# Patient Record
Sex: MALE | Race: WHITE
[De-identification: names, ages, dates, MRNs, and addresses within clinical notes are randomized per-mention and may not be internally consistent; named-entity substitution may affect disease eponyms.]

---

## 2019-05-30 ENCOUNTER — HOSPITAL ENCOUNTER (INPATIENT)
Dept: HOSPITAL 80 - FED | Age: 39
LOS: 3 days | Discharge: HOME | DRG: 331 | End: 2019-06-02
Attending: SURGERY | Admitting: SURGERY
Payer: COMMERCIAL

## 2019-05-30 DIAGNOSIS — K35.32: Primary | ICD-10-CM

## 2019-05-30 DIAGNOSIS — Z53.31: ICD-10-CM

## 2019-05-30 DIAGNOSIS — E86.9: ICD-10-CM

## 2019-05-30 LAB — PLATELET # BLD: 198 10^3/UL (ref 150–400)

## 2019-05-30 PROCEDURE — 0DTJ0ZZ RESECTION OF APPENDIX, OPEN APPROACH: ICD-10-PCS | Performed by: SURGERY

## 2019-05-30 PROCEDURE — 0DBH0ZZ EXCISION OF CECUM, OPEN APPROACH: ICD-10-PCS | Performed by: SURGERY

## 2019-05-30 PROCEDURE — 0DJW4ZZ INSPECTION OF PERITONEUM, PERCUTANEOUS ENDOSCOPIC APPROACH: ICD-10-PCS | Performed by: SURGERY

## 2019-05-30 RX ADMIN — HYDROCODONE BITARTRATE AND ACETAMINOPHEN PRN TAB: 5; 325 TABLET ORAL at 23:31

## 2019-05-30 RX ADMIN — HYDROCODONE BITARTRATE AND ACETAMINOPHEN PRN TAB: 5; 325 TABLET ORAL at 23:01

## 2019-05-30 NOTE — EDPHY
H & P


Stated Complaint: RLQ abd pain


Time Seen by Provider: 05/30/19 17:31


HPI/ROS: 


HPI:  This is a 38-year-old male who presents with





Chief Complaint:  Appendicitis from Radiology





Location:  Right lower quadrant abdominal 


Quality:  Intermittent, aching Pain


Duration:  3 days


Signs and Symptoms:  no fever, no nausea, no vomiting, no hematemesis, no blood 

in stool, no abdominal bloating, no diarrhea, no back pain, no urinary symptoms

, no testicular/groin pain, no indigestion, no chest pain, no shortness of 

breath


Timing:


Severity:


Context:  Patient presents from Radiology for CT abdomen and pelvis exam that 

was performed approximately 1 and 0.5 hr prior to arrival to the emergency room 

that shows acute appendicitis with an appendicolith.  CT shows an inflamed 

thickened appendix containing a proximal appendicolith measuring 5 x 6 mm.  The 

appendix measures up to 12-14 mm in thickness.  Patient was seen by his primary 

care provider at [facility] earlier this morning and sent this 

afternoon for CT abdomen and pelvis scan to evaluate for right lower quadrant 

abdominal pain.  He reports that he has had intermittent, aching, nonradiating 

periumbilical pain for the last 3 days.  It was not until his primary care 

provider palpated on his right lower quadrant that he noticed pain in that 

area.  He denies any fever, nausea, vomiting, urinary symptoms, back pain, 

diarrhea.  He ran the [employer] on Monday without any difficulty.  As he 

did not have any secondary symptoms of nausea, vomiting, fever, he believes it 

was either gas pain or musculoskeletal in nature.  When it did not stop over 

the last 3 days, he went to his primary care provider today to further 

evaluate.  Last meal was at 12 noon.


Modifying Factors:  None





Comment: 








ROS: A comprehensive 10 system review of systems is otherwise negative aside 

from elements mentioned in the history of present illness. 





MEDICAL/SURGICAL/SOCIAL HISTORY: 


Medical history:  Generally healthy.  Does not take any regular medications.


Surgical history:  Denies


Social history:  Employed,  with 16-month-old son.  Nonsmoker.  Family 

history noncontributory.











CONSTITUTIONAL:  Physically fit,  awake and alert, no obvious distress


HEENT: Atraumatic and normocephalic, PERRL, EOMI.  Nares patent; no rhinorrhea;

  no nasal mucosal edema. Tympanic membranes clear. Oropharynx clear, no 

exudate and moist pink mucosa.  Airway patent.  No lymphadenopathy.  No 

meningismus.


Cardiovascular: Normal S1/S2, regular rate, regular rhythm, without murmur rub 

or gallop.


PULMONARY/CHEST:  Symmetrical and nontender. Clear to auscultation bilaterally. 

Good air movement. No accessory muscle usage.


ABDOMEN:  Soft, nondistended, mild periumbilical tenderness and moderate right 

lower quadrant tenderness, no rebound, no guarding, no peritoneal signs, no 

masses or organomegaly. No CVAT.


EXTREMITIES:  2/2 pulses, strength 5/5, no deformities, no clubbing, no 

cyanosis or edema.


NEUROLOGICAL: no focal neuro deficits.  GCS 15.


SKIN: Warm and dry, no erythema. no rash.  Good capillary refill. 











Source: Patient


Exam Limitations: No limitations





- Personal History


Current Tetanus/Diphtheria Vaccine: Yes


Current Tetanus Diphtheria and Acellular Pertussis (TDAP): Yes





- Medical/Surgical History


Hx Asthma: No


Hx Chronic Respiratory Disease: No


Hx Diabetes: No


Hx Cardiac Disease: No


Hx Renal Disease: No


Hx Cirrhosis: No


Hx Alcoholism: No


Hx HIV/AIDS: No


Hx Splenectomy or Spleen Trauma: No


Other PMH: denies





- Social History


Smoking Status: Never smoked


Constitutional: 


 Initial Vital Signs











Temperature (C)  36.7 C   05/30/19 17:24


 


Heart Rate  59 L  05/30/19 17:24


 


Respiratory Rate  16   05/30/19 17:24


 


Blood Pressure  141/77 H  05/30/19 17:24


 


O2 Sat (%)  98   05/30/19 17:24








 











O2 Delivery Mode               Room Air














Allergies/Adverse Reactions: 


 





No Known Allergies Allergy (Unverified 05/30/19 17:24)


 








Home Medications: 














 Medication  Instructions  Recorded


 


NK [No Known Home Meds]  05/30/19














Medical Decision Making





- Diagnostics


Imaging Results: 


 Imaging Impressions





Abdomen CT  05/30/19 18:00


Impression: 


 


1. Acute appendicitis with an appendicolith. There is also some associated 

inflammation along the caudal margin of the cecum, and there are several 

prominent right lower quadrant lymph nodes. Surgical consultation and 

correlation with histopathology is recommended to assure that there is no 

underlying neoplastic infiltration.


2. There is a small rounded osteolucent lesion in the right L2 centrum, which 

may represent a small hemangioma, however is difficult to further confidently 

characterize. If there is further clinical concern, MR imaging or a bone scan 

could be considered.


3. Mild hepatomegaly.


 


I attempted to convey this information to Kalyani Mayer MD at 16:42, on 5/30 /2019. I spoke with Sally, the  at [phone], and indicated that 

we will escort the patient to the ED at [facility]. I have also spoken with Jamie Rivas PA-C in the ED regarding the results of the patient's CT scan.


 


 











ED Course/Re-evaluation: 


Vital signs reviewed and stable upon arrival.  No systemic signs.


IV access, laboratory studies ordered


Made NPO status, 1 L normal saline, IV Ceftriaxone and IV Flagyl given 


ED decision to consult General surgery.  This was made prior to results of 

laboratory studies.  Spoke with Dr. Brewer who kindly agrees to take 

patient to the OR and provide further care.


1800:  WBC 11 K. No signs of anemia/platelet dysfunction/KIRK/elevated LFTs/

electrolyte imbalance/pancreatitis. 


1810:  Notified by surgery that patient will go to the operating room for a 

laparoscopic appendectomy in approximately 1 hour. 





This patient was seen under the supervision of my secondary supervising 

physician.  I evaluated and cared for this patient with attending.  Discussed 

this patient with Dr. Duran who did not see the patient.  


Differential Diagnosis: 


Differential diagnosis includes but is not limited to appendicitis.








- Data Points


Laboratory Results: 


 Laboratory Results





 05/30/19 17:37 





 05/30/19 17:37 





 











  05/30/19 05/30/19





  17:37 17:37


 


WBC    10.71 10^3/uL H 10^3/uL





    (3.80-9.50) 


 


RBC    5.53 10^6/uL 10^6/uL





    (4.40-6.38) 


 


Hgb    16.1 g/dL g/dL





    (13.7-17.5) 


 


Hct    46.2 % %





    (40.0-51.0) 


 


MCV    83.5 fL fL





    (81.5-99.8) 


 


MCH    29.1 pg pg





    (27.9-34.1) 


 


MCHC    34.8 g/dL g/dL





    (32.4-36.7) 


 


RDW    12.7 % %





    (11.5-15.2) 


 


Plt Count    198 10^3/uL 10^3/uL





    (150-400) 


 


MPV    9.8 fL fL





    (8.7-11.7) 


 


Neut % (Auto)    73.1 % %





    (39.3-74.2) 


 


Lymph % (Auto)    18.0 % %





    (15.0-45.0) 


 


Mono % (Auto)    6.4 % %





    (4.5-13.0) 


 


Eos % (Auto)    1.4 % %





    (0.6-7.6) 


 


Baso % (Auto)    0.6 % %





    (0.3-1.7) 


 


Nucleat RBC Rel Count    0.0 % %





    (0.0-0.2) 


 


Absolute Neuts (auto)    7.83 10^3/uL H 10^3/uL





    (1.70-6.50) 


 


Absolute Lymphs (auto)    1.93 10^3/uL 10^3/uL





    (1.00-3.00) 


 


Absolute Monos (auto)    0.69 10^3/uL 10^3/uL





    (0.30-0.80) 


 


Absolute Eos (auto)    0.15 10^3/uL 10^3/uL





    (0.03-0.40) 


 


Absolute Basos (auto)    0.06 10^3/uL 10^3/uL





    (0.02-0.10) 


 


Absolute Nucleated RBC    0.00 10^3/uL 10^3/uL





    (0-0.01) 


 


Immature Gran %    0.5 % %





    (0.0-1.1) 


 


Immature Gran #    0.05 10^3/uL 10^3/uL





    (0.00-0.10) 


 


Sodium  137 mEq/L mEq/L  





   (135-145)  


 


Potassium  4.2 mEq/L mEq/L  





   (3.5-5.2)  


 


Chloride  97 mEq/L mEq/L  





   ()  


 


Carbon Dioxide  29 mEq/l mEq/l  





   (22-31)  


 


Anion Gap  11 mEq/L mEq/L  





   (6-14)  


 


BUN  12 mg/dL mg/dL  





   (7-23)  


 


Creatinine  0.9 mg/dL mg/dL  





   (0.7-1.3)  


 


Estimated GFR  > 60   





   


 


Glucose  92 mg/dL mg/dL  





   ()  


 


Calcium  9.4 mg/dL mg/dL  





   (8.5-10.4)  


 


Total Bilirubin  1.0 mg/dL mg/dL  





   (0.1-1.4)  


 


Conjugated Bilirubin  0.0 mg/dL mg/dL  





   (0.0-0.5)  


 


Unconjugated Bilirubin  1.0 mg/dL mg/dL  





   (0.0-1.1)  


 


AST  35 IU/L IU/L  





   (17-59)  


 


ALT  45 IU/L IU/L  





   (21-72)  


 


Alkaline Phosphatase  91 IU/L IU/L  





   ()  


 


Total Protein  7.9 g/dL g/dL  





   (6.3-8.2)  


 


Albumin  4.5 g/dL g/dL  





   (3.5-5.0)  


 


Lipase  46 IU/L IU/L  





   ()  











Medications Given: 


 





Metronidazole/Sodium Chloride (Flagyl 500 Mg (Premix))  100 mls @ 100 mls/hr IV 

EDNOW ONE


   PRN Reason: Protocol


   Stop: 05/30/19 18:41


   Last Admin: 05/30/19 18:12 Dose:  100 mls





Discontinued Medications





Sodium Chloride (Ns)  1,000 mls @ 0 mls/hr IV EDNOW ONE; Wide Open


   PRN Reason: Protocol


   Stop: 05/30/19 17:28


   Last Admin: 05/30/19 17:51 Dose:  1,000 mls


Ceftriaxone Sodium/Dextrose (Rocephin 1 Gm (Premix))  50 mls @ 100 mls/hr IV 

EDNOW ONE


   PRN Reason: Protocol


   Stop: 05/30/19 18:11


   Last Admin: 05/30/19 17:49 Dose:  50 mls








Departure





- Departure


Disposition: To OP Cath/Surgery


Clinical Impression: 


 Appendicolith





Acute appendicitis with generalized peritonitis without gangrene


Qualifiers:


 Appendicitis perforation presence: unspecified whether perforation present 

Appendicitis abscess presence: without abscess Qualified Code(s): K35.20 - 

Acute appendicitis with generalized peritonitis, without abscess





Condition: Fair

## 2019-05-30 NOTE — POSTOPPROG
Post Op Note


Date of Operation: 05/30/19


Surgeon: Joel Brewer


Anesthesiologist: sara


Anesthesia: GET(General Endotracheal)


Pre-op Diagnosis: appendicitis


Post-op Diagnosis: perforated appendicitis


Procedure: lap->open appendectomy c partial cecectomy


Findings: perfd at base, partial cecectomy to clean tissue 


Inf/Abcess present in the surg proc area at time of surgery?: Yes


Depth: Organ Space


EBL: Minimal


Total fluids administered: 1000cc NS washout


Drains: Ramesh Jenkins


Specimen(s): 





appendix   


partial cecum

## 2019-05-30 NOTE — PDANEPAE
ANE Past Medical History





- Cardiovascular History


Hx Hypertension: No


Hx Arrhythmias: No


Hx Chest Pain: No





- Pulmonary History


Hx COPD: No


Hx Asthma/Reactive Airway Disease: No


Hx Oxygen in Use at Home: No


Hx Sleep Apnea: No





- Neurologic History


Hx Cerebrovascular Accident: No


Hx Seizures: No





- Endocrine History


Hx Diabetes: No


Hypothyroid: No





- Renal History


Hx Renal Disorders: No





- Liver History


Hx Hepatic Disorders: No





- GI History


Hx Gastrointestinal Disorders: Yes


Gastrointestinal History Comment: appendicitis





ANE Review of Systems


Review of Systems: 








ANE Patient History





- Allergies


Allergies/Adverse Reactions: 








No Known Allergies Allergy (Unverified 05/30/19 17:24)


 








- Home Medications


Home Medications: 








NK [No Known Home Meds]  05/30/19 [Last Taken Unknown]








- NPO status


NPO Since - Liquids (Date): 05/30/19


NPO Since - Liquids (Time): 12:00


NPO Since - Solids (Date): 05/30/19


NPO Since - Solids (Time): 12:00





- Smoking Hx


Smoking Status: Never smoked





WINSOME Labs/Vital Signs





- Labs


Result Diagrams: 


 05/30/19 17:37





 05/30/19 17:37





- Vital Signs


Blood Pressure: 121/75


Heart Rate: 60


Respiratory Rate: 15


O2 Sat (%): 99


Height: 182.88 cm


Weight: 74.843 kg





WINSOME Physical Exam





- Airway


Neck exam: FROM


Mallampati Score: Class 1


Mouth exam: normal dental/mouth exam





- Pulmonary


Pulmonary: no respiratory distress, no rales or rhonchi, clear to auscultation





- Cardiovascular


Cardiovascular: regular rate and rhythym, no murmur, rub, or gallop





- ASA Status


ASA Status: II, E





WINSOME Anesthesia Plan


Anesthesia Plan: general endotracheal anesthesia

## 2019-05-30 NOTE — GOP
[f rep st]



                                                                OPERATIVE REPORT





DATE OF OPERATION:  05/30/2019



SURGEON:  Joel Brewer MD



ASSISTANT:  None.



ANESTHESIA:  General endotracheal.



ANESTHESIOLOGIST:  Jeremie Vu MD.



PREOPERATIVE DIAGNOSIS:  Appendicitis.



POSTOPERATIVE DIAGNOSIS:  Perforated appendicitis.



PROCEDURE PERFORMED:  Laparoscopic converted to open appendectomy with partial cecectomy.



FINDINGS:  Appendix was perforated at the base.  The base of the cecum was very indurated.  It was di
fficult to get a stapler across it, conversion to open, partial cecectomy with good tissue.



SPECIMENS:  Appendix with partial cecum.



ESTIMATED BLOOD LOSS:  10 cc



DESCRIPTION OF PROCEDURE:  The patient was greeted in the preoperative suite.  Once again, risks, itzel
efits, and alternatives were discussed.  Consent was signed.  He was then brought back to the operati
ve suite, placed on the OR table in supine position.  After all anesthesia machines including SCDs we
re on and functioning, [employer] time-out was performed.  After successful induction 
of general anesthesia, the patient's abdomen was prepped and draped in typical sterile fashion.  I co
mmenced the procedure by creating an infraumbilical cutdown through which the Veress needle was passe
d.  I achieved pneumoperitoneum to 15 mmHg, which was well tolerated by the patient.  I then inserted
 a 12 mm trocar through this site.  Two additional 5 mm trocars were placed, 1 in the suprapubic, 1 i
n the left lower quadrant.  Once successfully in the abdomen, I identified the appendix which was ind
urated.  I created a window at the base and immediately noticed that there was purulent feculent flui
d emanating from this.  I attempted to extend my window up and get the stapler around this; however, 
I was unsuccessful, so I did end up amputating the appendix distal to this, as well as taking the mes
oappendix with a fire of the Endo-MISTY white load stapler.  I removed the specimen.  Laparoscopically 
I attempted to get around this portion of the cecum, but it was very indurated.  I did try to staple 
it off, but was unsuccessful.  I did create a midline incision.  I mobilized the right colon and succ
essfully brought the cecum up.  It was very indurated all the way through the base.  I was able to hubbard
ccessfully create a staple fire line above this, protecting the terminal ileum throughout, and using 
multiple fires of the MISTY blue load stapler was able to take a portion of the cecum which was healthy
 including the perforation site.  This staple line was clean, dry, and intact.  I did over-sew it wit
h some surrounding colon as well as adjacent peritoneum.  Once this was done, I irrigated the right l
ower quadrant as well as the pelvis, noting clear effluent in the suction canister.  A 15-Kittitian MORIS w
as brought in through the left most stab site and allowed to lay in the right lower quadrant.  It was
 attached to the skin with a stapler.  Clean closure was brought in.  Gloves, gown were changed.  The
 midline fascia was reapproximated with a #1 PDS noting excellent fascial reapproximation.  The subcu
taneous was irrigated.  The skin was closed with staples.  Sterile dressings were placed.  The patien
t was then extubated in the operative suite and taken to the PACU in satisfactory condition.



DRAINS:  15-Kittitian MORIS to the right lower quadrant.



COUNTS:  All counts were reported as correct x2.





Job #:  [id number]

## 2019-05-30 NOTE — PDGENHP
History and Physical





- Chief Complaint


abdominal pain 





- History of Present Illness


Otherwise healthy 39yo M who presents from his PCP office with abd pain. Briefly

, patient was in his usual state of health. Began to have some pain twinges in 

his right abdomen starting Monday night, this did not preclude him from working 

or going on about his day so he paid little attention to it. This persisted 

which prompted his PCP visit earlier today. In the clinic there was concern for 

appendicitis and he was sent for a CT which confirmed appendicitis. In the ED, 

he complains of RLQ pain worse with palpation currently a 5/10 in intensity 

without radiation,. 





History Information





- Allergies/Home Medication List


Allergies/Adverse Reactions: 








No Known Allergies Allergy (Unverified 05/30/19 17:24)


 





Home Medications: 








NK [No Known Home Meds]  05/30/19 [Last Taken Unknown]





I have personally reviewed and updated: family history, medical history, social 

history, surgical history





- Past Medical History


no pertinent PMH





- Surgical History


Reports: no pertinent surgical hx





- Family History


Positive for: non-pertinent





- Social History


Smoking Status: Never smoked


Additional social history: works on [employer]





Review of Systems


Review of Systems: 





ROS: 10pt was reviewed & negative except for what was stated in HPI & below





Physical Exam


Physical Exam: 

















Temp Pulse Resp BP Pulse Ox


 


 36.9 C   60   16   127/74 H  97 


 


 05/30/19 18:25  05/30/19 18:25  05/30/19 18:25  05/30/19 18:25  05/30/19 18:25











Constitutional: no apparent distress, appears nourished, not in pain


Eyes: PERRL, anicteric sclera, EOMI


Ears, Nose, Mouth, Throat: moist mucous membranes, hearing normal, ears appear 

normal, no oral mucosal ulcers


Cardiovascular: regular rate and rhythym, no murmur, rub, or gallop, No edema


Respiratory: no respiratory distress, no rales or rhonchi, clear to auscultation


Gastrointestinal: normoactive bowel sounds, no palpable masses, other (TTP in 

the RLQ, no rebound or guarding )


Genitourinary: no bladder fullness, no bladder tenderness


Skin: warm, normal color, no rashes or abrasions, no fluctuance, no induration, 

No mottled


Musculoskeletal: full muscle strength, no muscle tenderness, normal joint ROM, 

no joint effusions


Psychiatric: interacting appropriately, not anxious, not encephalopathic, 

thought process linear


Lymph, Heme, Immunologic: no cervical LAD, no supraclavicular LAD





Lab Data & Imaging Review





 05/30/19 17:37





 05/30/19 17:37














WBC  10.71 10^3/uL (3.80-9.50)  H  05/30/19  17:37    


 


RBC  5.53 10^6/uL (4.40-6.38)   05/30/19  17:37    


 


Hgb  16.1 g/dL (13.7-17.5)   05/30/19  17:37    


 


Hct  46.2 % (40.0-51.0)   05/30/19  17:37    


 


MCV  83.5 fL (81.5-99.8)   05/30/19  17:37    


 


MCH  29.1 pg (27.9-34.1)   05/30/19  17:37    


 


MCHC  34.8 g/dL (32.4-36.7)   05/30/19  17:37    


 


RDW  12.7 % (11.5-15.2)   05/30/19  17:37    


 


Plt Count  198 10^3/uL (150-400)   05/30/19  17:37    


 


MPV  9.8 fL (8.7-11.7)   05/30/19  17:37    


 


Neut % (Auto)  73.1 % (39.3-74.2)   05/30/19  17:37    


 


Lymph % (Auto)  18.0 % (15.0-45.0)   05/30/19  17:37    


 


Mono % (Auto)  6.4 % (4.5-13.0)   05/30/19  17:37    


 


Eos % (Auto)  1.4 % (0.6-7.6)   05/30/19  17:37    


 


Baso % (Auto)  0.6 % (0.3-1.7)   05/30/19  17:37    


 


Nucleat RBC Rel Count  0.0 % (0.0-0.2)   05/30/19  17:37    


 


Absolute Neuts (auto)  7.83 10^3/uL (1.70-6.50)  H  05/30/19  17:37    


 


Absolute Lymphs (auto)  1.93 10^3/uL (1.00-3.00)   05/30/19  17:37    


 


Absolute Monos (auto)  0.69 10^3/uL (0.30-0.80)   05/30/19  17:37    


 


Absolute Eos (auto)  0.15 10^3/uL (0.03-0.40)   05/30/19  17:37    


 


Absolute Basos (auto)  0.06 10^3/uL (0.02-0.10)   05/30/19  17:37    


 


Absolute Nucleated RBC  0.00 10^3/uL (0-0.01)   05/30/19  17:37    


 


Immature Gran %  0.5 % (0.0-1.1)   05/30/19  17:37    


 


Immature Gran #  0.05 10^3/uL (0.00-0.10)   05/30/19  17:37    


 


Sodium  137 mEq/L (135-145)   05/30/19  17:37    


 


Potassium  4.2 mEq/L (3.5-5.2)   05/30/19  17:37    


 


Chloride  97 mEq/L ()   05/30/19  17:37    


 


Carbon Dioxide  29 mEq/l (22-31)   05/30/19  17:37    


 


Anion Gap  11 mEq/L (6-14)   05/30/19  17:37    


 


BUN  12 mg/dL (7-23)   05/30/19  17:37    


 


Creatinine  0.9 mg/dL (0.7-1.3)   05/30/19  17:37    


 


Estimated GFR  > 60   05/30/19  17:37    


 


Glucose  92 mg/dL ()   05/30/19  17:37    


 


Calcium  9.4 mg/dL (8.5-10.4)   05/30/19  17:37    


 


Total Bilirubin  1.0 mg/dL (0.1-1.4)   05/30/19  17:37    


 


Conjugated Bilirubin  0.0 mg/dL (0.0-0.5)   05/30/19  17:37    


 


Unconjugated Bilirubin  1.0 mg/dL (0.0-1.1)   05/30/19  17:37    


 


AST  35 IU/L (17-59)   05/30/19  17:37    


 


ALT  45 IU/L (21-72)   05/30/19  17:37    


 


Alkaline Phosphatase  91 IU/L ()   05/30/19  17:37    


 


Total Protein  7.9 g/dL (6.3-8.2)   05/30/19  17:37    


 


Albumin  4.5 g/dL (3.5-5.0)   05/30/19  17:37    


 


Lipase  46 IU/L ()   05/30/19  17:37    








Visualized and Interpreted imaging results: Yes


Interpretation: CT: acute appendicitis with appendicolith//





Assessment & Plan


Assessment: 








Acute appendicitis with generalized peritonitis without gangrene (Acute)


Appendicolith (Acute)








Plan: 





39yo M c acute appendicitis


- to OR for laparoscopic appendectomy


- RBA discussed 


- patient received ceftriaxone and flagyl in the ED

## 2019-05-31 RX ADMIN — KETOROLAC TROMETHAMINE PRN MG: 15 INJECTION, SOLUTION INTRAMUSCULAR; INTRAVENOUS at 00:36

## 2019-05-31 RX ADMIN — IBUPROFEN SCH: 600 TABLET ORAL at 02:47

## 2019-05-31 RX ADMIN — IBUPROFEN SCH: 600 TABLET ORAL at 13:44

## 2019-05-31 RX ADMIN — HYDROCODONE BITARTRATE AND ACETAMINOPHEN PRN TAB: 5; 325 TABLET ORAL at 14:21

## 2019-05-31 RX ADMIN — HYDROCODONE BITARTRATE AND ACETAMINOPHEN PRN TAB: 5; 325 TABLET ORAL at 19:50

## 2019-05-31 RX ADMIN — HYDROCODONE BITARTRATE AND ACETAMINOPHEN PRN TAB: 5; 325 TABLET ORAL at 09:11

## 2019-05-31 RX ADMIN — CYCLOBENZAPRINE HYDROCHLORIDE SCH MG: 10 TABLET, FILM COATED ORAL at 16:09

## 2019-05-31 RX ADMIN — IBUPROFEN SCH MG: 600 TABLET ORAL at 21:32

## 2019-05-31 RX ADMIN — KETOROLAC TROMETHAMINE PRN MG: 15 INJECTION, SOLUTION INTRAMUSCULAR; INTRAVENOUS at 09:11

## 2019-05-31 RX ADMIN — CYCLOBENZAPRINE HYDROCHLORIDE SCH MG: 10 TABLET, FILM COATED ORAL at 21:32

## 2019-05-31 NOTE — PDMN
Medical Necessity


Medical necessity: Change to inpt as of 5/31/19, meets inpt criteria per MD 

order and [facility] [place] App[place]ectomy, with Abscess or Peritonitis, 3 days, inpt 

indicated for perforated appendicitis requiring laparoscopic converted to open 

appendectomy w/partial cecectomy, IV ABX's, post-op care, anticipate>2MN.

## 2019-05-31 NOTE — ASMTCMCOM
CM Note

 

CM Note                       

Notes:

Pt is s/p lap appy. He is on clear liquids and hopefully will be ready for d/c tomorrow. He 

currently has no CM needs. 



D/C plan: home independent

 

Date Signed:  05/31/2019 02:57 PM

Electronically Signed By:ERMA Butt

## 2019-05-31 NOTE — SOAPPROG
SOAP Progress Note


Assessment/Plan: 


Assessment:





POD#1 s/p lap-open appendectomy for perforated appendicitis


- VSS, HDS, afebrile


- pain controlled, having mostly spasms, will add muscle relaxer


- Tolerating clears, will ADAT


- MORIS serosang, keep


- cont IV abx 2/2 perf, will likely transition to PO on dc


- ambulate, anticipate home in AM 




















Plan:





05/31/19 15:49





Subjective: 





tolerating clears


Objective: 





 Vital Signs











Temp Pulse Resp BP Pulse Ox


 


 37.1 C   74   16   110/60   94 


 


 05/31/19 11:45  05/31/19 11:45  05/31/19 11:45  05/31/19 11:45  05/31/19 11:45














ICD10 Worksheet


Patient Problems: 


 Problems











Problem Status Onset


 


Acute appendicitis with generalized peritonitis without gangrene Acute  


 


Appendicolith Acute

## 2019-06-01 RX ADMIN — CYCLOBENZAPRINE HYDROCHLORIDE SCH MG: 10 TABLET, FILM COATED ORAL at 09:25

## 2019-06-01 RX ADMIN — HYDROCODONE BITARTRATE AND ACETAMINOPHEN PRN TAB: 5; 325 TABLET ORAL at 09:25

## 2019-06-01 RX ADMIN — HYDROCODONE BITARTRATE AND ACETAMINOPHEN PRN TAB: 5; 325 TABLET ORAL at 17:28

## 2019-06-01 RX ADMIN — IBUPROFEN SCH MG: 600 TABLET ORAL at 05:11

## 2019-06-01 RX ADMIN — IBUPROFEN SCH MG: 600 TABLET ORAL at 21:08

## 2019-06-01 RX ADMIN — HYDROCODONE BITARTRATE AND ACETAMINOPHEN PRN TAB: 5; 325 TABLET ORAL at 01:39

## 2019-06-01 RX ADMIN — IBUPROFEN SCH MG: 600 TABLET ORAL at 14:56

## 2019-06-01 RX ADMIN — CYCLOBENZAPRINE HYDROCHLORIDE SCH MG: 10 TABLET, FILM COATED ORAL at 17:27

## 2019-06-01 RX ADMIN — CYCLOBENZAPRINE HYDROCHLORIDE SCH MG: 10 TABLET, FILM COATED ORAL at 21:08

## 2019-06-02 VITALS — SYSTOLIC BLOOD PRESSURE: 119 MMHG | DIASTOLIC BLOOD PRESSURE: 64 MMHG

## 2019-06-02 RX ADMIN — IBUPROFEN SCH MG: 600 TABLET ORAL at 05:46

## 2019-06-02 RX ADMIN — CYCLOBENZAPRINE HYDROCHLORIDE SCH MG: 10 TABLET, FILM COATED ORAL at 09:15

## 2019-06-02 RX ADMIN — HYDROCODONE BITARTRATE AND ACETAMINOPHEN PRN TAB: 5; 325 TABLET ORAL at 05:46

## 2019-06-02 RX ADMIN — HYDROCODONE BITARTRATE AND ACETAMINOPHEN PRN TAB: 5; 325 TABLET ORAL at 09:56

## 2019-06-02 NOTE — ASMTLACE
LACE

 

Length of stay for            Answers:  3 days                                

current admission                                                             

Acuity / Level of             Answers:  Yes                                   

Care: Did the patient                                                         

have an inpatient                                                             

admission?                                                                    

# of Emergency department     Answers:  1-2                                   

visits in the last 6                                                          

months                                                                        

Score: 7

 

Date Signed:  06/02/2019 09:25 AM

Electronically Signed By:ERMA Oakes

## 2019-06-02 NOTE — SOAPPROG
SOAP Progress Note


Assessment/Plan: 


Assessment:


doing well/ afebrile/ no flatus/ wounds ok/ tolerating po





chest clear/  abd soft, wounds ok/  cor rr

















Plan:home in am





06/02/19 09:06





Objective: 





 Vital Signs











Temp Pulse Resp BP Pulse Ox


 


 36.9 C   77   16   119/64   96 


 


 06/02/19 07:48  06/02/19 07:48  06/02/19 07:48  06/02/19 07:48  06/02/19 07:48








 











 06/01/19 06/02/19 06/03/19





 05:59 05:59 05:59


 


Intake Total 2103 800 


 


Output Total 1410 10 


 


Balance 693 790 














ICD10 Worksheet


Patient Problems: 


 Problems











Problem Status Onset


 


Acute appendicitis with generalized peritonitis without gangrene Acute  


 


Appendicolith Acute

## 2019-06-02 NOTE — SOAPPROG
SOAP Progress Note


Assessment/Plan: 


Assessment:


doing well/ afebrile/ no flatus/ wounds ok/ tolerating po





chest clear/  abd soft, wounds ok/  cor rr

















Plan:home in am





06/02/19 09:06





06/02/19 09:08


improved/ afebrile/ abd soft/ + BS, + flatus


home today





Objective: 





 Vital Signs











Temp Pulse Resp BP Pulse Ox


 


 36.9 C   77   16   119/64   96 


 


 06/02/19 07:48  06/02/19 07:48  06/02/19 07:48  06/02/19 07:48  06/02/19 07:48








 











 06/01/19 06/02/19 06/03/19





 05:59 05:59 05:59


 


Intake Total 2103 800 


 


Output Total 1410 10 


 


Balance 693 790 














ICD10 Worksheet


Patient Problems: 


 Problems











Problem Status Onset


 


Acute appendicitis with generalized peritonitis without gangrene Acute  


 


Appendicolith Acute

## 2019-06-05 NOTE — GDS
[f rep st]



                                                             DISCHARGE SUMMARY





DISCHARGE DIAGNOSIS:  Perforated appendicitis.



PROCEDURES:  Laparoscopic converted to open appendectomy with partial cecectomy.



INTRAOPERATIVE FINDINGS:  Patient was found to have a perforated appendix at the appendix base.  The 
base of the cecum was also very indurated.  It was difficult to get a stapler across the base and so 
there was conversion to an open procedure with partial cecectomy and with good remaining tissue.



SPECIAL TESTS:  CT scan of the abdomen and pelvis showed acute appendicitis with an appendicolith.  T
here is also associated inflammation along the caudal margin of the cecum with several prominent righ
t lower quadrant lymph nodes.  Please see report for full details.



HOSPITAL COURSE:  The patient is a 38-year-old male who presented to the emergency department with pr
ogressing abdominal pain.  This localized to the right lower quadrant.  He was seen by his PCP.  Ther
e was concern for appendicitis, so he was sent to the ED were noted were a CT scan suggested appendic
itis.  He had a slightly elevated white blood cell count of 10.7 with a low-grade fever.  He was brou
ght to the operating room and underwent a laparoscopic appendectomy which was converted to an open pr
ocedure with cecectomy as described above.  The procedure was uncomplicated and he tolerated it well.
 



The patient's postoperative course was relatively uneventful.  He did develop a postoperative fever. 
 A MORIS drain was kept in place.  He was kept on IV antibiotics during his hospital stay.  His diet was
 advanced.  His pain was controlled.



DISCHARGE INSTRUCTIONS:  Ultimately, the patient is discharged in stable condition with prescriptions
 for hydrocodone and instructions to take Motrin.  Limitations were discussed and outpatient followup
 was planned.





Job #:  [id number]

## 2019-06-21 ENCOUNTER — HOSPITAL ENCOUNTER (INPATIENT)
Dept: HOSPITAL 80 - F3E | Age: 39
LOS: 6 days | Discharge: HOME | End: 2019-06-27
Payer: COMMERCIAL